# Patient Record
Sex: FEMALE | Race: OTHER | NOT HISPANIC OR LATINO | URBAN - METROPOLITAN AREA
[De-identification: names, ages, dates, MRNs, and addresses within clinical notes are randomized per-mention and may not be internally consistent; named-entity substitution may affect disease eponyms.]

---

## 2017-06-07 NOTE — PATIENT DISCUSSION
CATARACTS, OU: VISUALLY SIGNIFICANT. OPTION OF SURGERY VERSUS FOLLOWING VERSUS UPDATING GLASSES DISCUSSED. RBA'S DISCUSSED, PATIENT UNDERSTANDS AND DESIRES SURGERY TO INCREASE VISION FOR  DRIVING AT NIGHT. SCHEDULE CATARACT SURGERY/PRE-OP . PT HAS TRIED MONOVISION IN PAST- DOESN'T LIKE. ADVISED TO STAY OUT OF CTLS 2 WEEKS PRIOR TO PRE-OP.

## 2017-06-07 NOTE — PATIENT DISCUSSION
[facility]2017[phone] -2&nbsp;SN &nbsp; &nbsp; cld

## 2017-06-07 NOTE — PATIENT DISCUSSION
HYDROXYCHLOROQUINE (PLAQUENIL) USE:  NO OCULAR TOXICITY OU. CONTINUE PLAQUENIL AS DIRECTED. SCHEDULE YEARLY HVF10-2  AND ASSESMENT OF COLOR PLATES. PATIENT INSTRUCTED TO RETURN TO PCP.

## 2018-08-14 NOTE — PATIENT DISCUSSION
[facility]14/2018[facility]-[phone]&nbsp;SN &nbsp; &nbsp; akm

## 2019-09-13 NOTE — PATIENT DISCUSSION
[facility]/2019[facility]-[id number]&nbsp; &nbsp; &nbsp; mlw

## 2019-09-13 NOTE — PATIENT DISCUSSION
CATARACTS, OU: BECOMING VISUALLY SIGNIFICANT BUT NOT BOTHERSOME TO PATIENT AT THIS TIME. SPEC AND CL RX OFFERED. FOLLOW AS SCHEDULED.

## 2019-09-13 NOTE — PATIENT DISCUSSION
DRY EYE SYNDROME, OU: PRESCRIBED REFRESH CONTACTS ARTIFICIAL TEARS BID - QID, OU. ALSO ADVISED TAKING MORE FREQUENT BREAKS FROM CONTACT LENSES IF POSSIBLE. RETURN FOR FOLLOW-UP AS SCHEDULED OR SOONER IF SYMPTOMS WORSEN.

## 2019-09-13 NOTE — PATIENT DISCUSSION
MYOPIA/ASTIGMATISM/PRESBYOPIA, OU: PRESCRIBED GLASSES AND CONTACTS. TRIAL LENSES ORDERED. PATIENT TO CALL WITH ANY ISSUES. (LENS WAS ROTATING RIGHT OD). FOLLOW-UP AS SCHEDULED.

## 2020-09-21 NOTE — PATIENT DISCUSSION
HYDROXYCHLOROQUINE (PLAQUENIL) USE: NO OCULAR TOXICITY OU. CONTINUE PLAQUENIL AS DIRECTED. [facility] 11/2020 FOR UPDATED HVF 10-2. SCHEDULE YEARLY VF 10-2,OCT MACULA AND DILATED FUNDUS EXAM. KEEP FOLLOW UP AS SCHEDULED.

## 2020-09-21 NOTE — PATIENT DISCUSSION
DRY EYE SYNDROME, OU: CONTINUE REFRESH CONTACTS ARTIFICIAL TEARS BID - QID, OU. ALSO ADVISED TAKING MORE FREQUENT BREAKS FROM CONTACT LENSES IF POSSIBLE. RETURN FOR FOLLOW-UP AS SCHEDULED OR SOONER IF SYMPTOMS WORSEN.

## 2020-09-21 NOTE — PATIENT DISCUSSION
MYOPIA/ASTIGMATISM/PRESBYOPIA, OU: PRESCRIBED GLASSES AND CONTACTS. TRIAL LENSES ORDERED. EDU PT NOT GREAT CANDIDATE FOR TORIC MULTIFOCAL DUE TO SIGNIFICANT ASTIGMATISM (OUTSIDE LENS PARAMETERS OD). PATIENT TO CALL WITH ANY ISSUES. FOLLOW-UP AS SCHEDULED.

## 2020-11-02 NOTE — PATIENT DISCUSSION
[phone]/[id number] -&nbsp; &nbsp; &nbsp; Nik Fraser

## 2020-11-02 NOTE — PATIENT DISCUSSION
NEW CLS RXED. BF TORIC XR/BF TORIC. CALL/RTC IF NOTING DIFFICULTY  WITH COMFORT OR VISION IN NEW CLS.

## 2021-11-18 NOTE — PATIENT DISCUSSION
Educated patient on findings. Slowly becoming visually significant, but patient not bothered. Refer to oculoplastics specialist if patient desires. Monitor.

## 2021-11-18 NOTE — PATIENT DISCUSSION
Updated SRx released to patient. Will order new CL trials for patient to try/wear before cataract consult. If she decides to not proceed with consult will need updated CLRx. Discussed proper wear/care/hygiene of CLs. RTC if discomfort.

## 2021-11-18 NOTE — PATIENT DISCUSSION
Educated patient on findings and condition. Prescribe CL-safe artificial tears BID-QID OU and warm compresses QD-BID OU. Advised to call/RTC if si/sx persist or worsen. Monitor.

## 2021-11-18 NOTE — PATIENT DISCUSSION
Educated patient on findings and condition. Visually significant. Refer to Dr. Paco Gilbert for cataract evaluation. Updated SRx released to patient in event she decides not to proceed with consult. Monitor.

## 2022-01-27 NOTE — PATIENT DISCUSSION
The patient feels that the cataract is significantly impacting daily activities and has elected cataract surgery. The risks, benefits, and alternatives to surgery were discussed. The patient elects to proceed with surgery in the right eye first then the left eye.

## 2022-01-27 NOTE — PATIENT DISCUSSION
Discussed various IOL options with patient. Toric IOL was discussed as needed for full correction of astigmatism.

## 2022-01-27 NOTE — PATIENT DISCUSSION
If axial length is greater than 26mm, will need retinal consultation prior to cataract surgery. Discussed with the patient.

## 2022-03-03 NOTE — PATIENT DISCUSSION
VISUALLY SIGNIFICANT. SCHEDULE PHACO WITH IOL OD SET FOR DISTANCE  FIRST THEN OS SET FOR DISTANCE IF VISUAL SYMPTOMS PERSIST.

## 2022-05-11 NOTE — PATIENT DISCUSSION
Doing well but has some mild irritation. Gave patient samples of Refresh Gel drops to use 2-3 time a day.

## 2022-06-02 NOTE — PATIENT DISCUSSION
Educated patient on findings and condition. Prescribe PF artificial tears 4-6x/day OU. Monitor at follow-up in 1 month, or sooner prn.

## 2022-06-02 NOTE — PATIENT DISCUSSION
Patient deferred DFE today to assess retinal health due to new floaters/flashes s/p PCIOL OU. She prefers to come back with . Discussed si/sx of RD including ^floaters/flashes/veil over vision and advised to call/RTC immediately if any occur. RTC 1 month for DFE, or sooner prn.

## 2022-07-11 NOTE — PATIENT DISCUSSION
Educated patient on findings and condition. Continue PF artificial tears 4-6x/day OU and begin Refresh Celluvisc or Refresh PM qhs OU. Additionally begin warm compresses and eyelid scrubs QD-BID OU. Monitor at follow-up in 1 month, or sooner prn.

## 2022-08-08 ENCOUNTER — NEW PATIENT (OUTPATIENT)
Dept: URBAN - METROPOLITAN AREA CLINIC 49 | Facility: CLINIC | Age: 62
End: 2022-08-08

## 2022-08-08 DIAGNOSIS — H02.834: ICD-10-CM

## 2022-08-08 DIAGNOSIS — H43.812: ICD-10-CM

## 2022-08-08 DIAGNOSIS — H40.013: ICD-10-CM

## 2022-08-08 DIAGNOSIS — H25.13: ICD-10-CM

## 2022-08-08 DIAGNOSIS — H02.831: ICD-10-CM

## 2022-08-08 PROCEDURE — 76514 ECHO EXAM OF EYE THICKNESS: CPT

## 2022-08-08 PROCEDURE — 92004 COMPRE OPH EXAM NEW PT 1/>: CPT

## 2022-08-08 ASSESSMENT — KERATOMETRY
OS_AXISANGLE2_DEGREES: 76
OS_K1POWER_DIOPTERS: 44.00
OS_K2POWER_DIOPTERS: 44.75
OS_AXISANGLE_DEGREES: 166

## 2022-08-08 ASSESSMENT — TONOMETRY
OD_IOP_MMHG: 18
OD_IOP_MMHG: 13
OS_IOP_MMHG: 22
OS_IOP_MMHG: 18

## 2022-08-08 ASSESSMENT — VISUAL ACUITY
OS_SC: 20/20-1
OD_GLARE: 20/30
OD_SC: 20/20-2
OD_GLARE: 20/20
OS_GLARE: 20/40
OS_GLARE: 20/20
OU_CC: J1+

## 2022-08-08 ASSESSMENT — PACHYMETRY
OS_CT_UM: 607
OD_CT_UM: 617

## 2023-11-01 ENCOUNTER — COMPREHENSIVE EXAM (OUTPATIENT)
Dept: URBAN - METROPOLITAN AREA CLINIC 49 | Facility: LOCATION | Age: 63
End: 2023-11-01

## 2023-11-01 DIAGNOSIS — H02.834: ICD-10-CM

## 2023-11-01 DIAGNOSIS — H40.013: ICD-10-CM

## 2023-11-01 DIAGNOSIS — H25.13: ICD-10-CM

## 2023-11-01 DIAGNOSIS — H02.831: ICD-10-CM

## 2023-11-01 DIAGNOSIS — H43.812: ICD-10-CM

## 2023-11-01 PROCEDURE — 92014 COMPRE OPH EXAM EST PT 1/>: CPT

## 2023-11-01 ASSESSMENT — VISUAL ACUITY
OD_GLARE: 20/30
OS_GLARE: 20/25
OU_CC: J1+@16"
OD_GLARE: 20/25
OU_SC: 20/20
OS_GLARE: 20/20
OS_SC: 20/20
OD_SC: 20/25

## 2023-11-01 ASSESSMENT — TONOMETRY
OD_IOP_MMHG: 15
OS_IOP_MMHG: 16
OS_IOP_MMHG: 20
OD_IOP_MMHG: 20

## 2023-11-06 NOTE — PATIENT DISCUSSION
CATARACTS, OU: BECOMING VISUALLY SIGNIFICANT BUT NOT BOTHERSOME TO PATIENT AT THIS TIME. FOLLOW AS SCHEDULED. Per chart review patient did have procedure at [facility] on 6/24/2011  IV sedation     Left message for patient to call back.